# Patient Record
Sex: MALE | Race: ASIAN | Employment: FULL TIME | URBAN - METROPOLITAN AREA
[De-identification: names, ages, dates, MRNs, and addresses within clinical notes are randomized per-mention and may not be internally consistent; named-entity substitution may affect disease eponyms.]

---

## 2024-01-28 ENCOUNTER — HOSPITAL ENCOUNTER (OUTPATIENT)
Age: 34
Discharge: HOME | End: 2024-01-28

## 2024-01-28 VITALS
DIASTOLIC BLOOD PRESSURE: 81 MMHG | RESPIRATION RATE: 20 BRPM | SYSTOLIC BLOOD PRESSURE: 140 MMHG | OXYGEN SATURATION: 97 % | TEMPERATURE: 97 F | HEART RATE: 92 BPM

## 2024-01-28 DIAGNOSIS — J06.9 UPPER RESPIRATORY TRACT INFECTION, UNSPECIFIED TYPE: ICD-10-CM

## 2024-01-28 DIAGNOSIS — Z87.09 HISTORY OF ASTHMA: ICD-10-CM

## 2024-01-28 DIAGNOSIS — Z76.0 ENCOUNTER FOR MEDICATION REFILL: Primary | ICD-10-CM

## 2024-01-28 PROCEDURE — 99203 OFFICE O/P NEW LOW 30 MIN: CPT

## 2024-01-28 PROCEDURE — 99204 OFFICE O/P NEW MOD 45 MIN: CPT

## 2024-01-28 RX ORDER — ALBUTEROL SULFATE 90 UG/1
2 AEROSOL, METERED RESPIRATORY (INHALATION) EVERY 4 HOURS PRN
Qty: 1 EACH | Refills: 0 | Status: SHIPPED | OUTPATIENT
Start: 2024-01-28 | End: 2024-02-27

## 2024-01-28 NOTE — DISCHARGE INSTRUCTIONS
Use inhaler as needed for shortness of breath and/or wheezing    Alternate Tylenol and Motrin every 3 hours for pain or fever > 100.4 degrees  Drink plenty of fluids   Get plenty of rest     You may benefit from taking a decongestant (e.g. Sudafed)  You may benefit from taking a daily allergy medication (e.g. Zyrtec)  You may benefit from using a humidifier    Sleep with head elevated and avoid laying flat  Avoid having air blow on your face    Wash hands often  Disinfect your environment  Do not share utensils or drinks    Symptoms may take a few weeks to resolve  Follow up with your primary care provider

## 2024-01-28 NOTE — ED PROVIDER NOTES
Chief Complaint   Patient presents with    Refill Request       History obtained from: patient    services not used    HPI:     Kellen Vivas is a 33 year old male who presents for medication refill. Patient states he has a history of asthma and is running low on his albuterol inhaler and needs a refill.  Patient states he has had upper respiratory symptoms including cough, nasal congestion, and postnasal drip for the past 1 to 2 weeks.  Patient states symptoms are improving and he is feeling well but states he has had to use his inhaler occasionally for wheezing associated with coughing.  Patient states [place] is going out of the country tomorrow and would like a refill before he leaves.  Denies fever, chills, chest pain, shortness of breath, dizziness, syncope, abdominal pain, nausea, vomiting, diarrhea, rash.    PMH  History reviewed. No pertinent past medical history.    PFSH    PFS asessment screens reviewed and agree.  Nurses notes reviewed I agree with documentation.    No family history on file.  Family history reviewed with patient/caregiver and is not pertinent to presenting problem.  Social History     Socioeconomic History    Marital status:      Spouse name: Not on file    Number of children: Not on file    Years of education: Not on file    Highest education level: Not on file   Occupational History    Not on file   Tobacco Use    Smoking status: Not on file    Smokeless tobacco: Not on file   Substance and Sexual Activity    Alcohol use: Not on file    Drug use: Not on file    Sexual activity: Not on file   Other Topics Concern    Not on file   Social History Narrative    Not on file     Social Determinants of Health     Financial Resource Strain: Not on file   Food Insecurity: Not on file   Transportation Needs: Not on file   Physical Activity: Not on file   Stress: Not on file   Social Connections: Not on file   Housing Stability: Not on file         ROS:   Positive for stated  complaint: Cough  All other systems reviewed and negative except as noted above.  Constitutional and Vital Signs Reviewed.    Physical Exam:     Findings:    /81   Pulse 92   Temp 97.1 °F (36.2 °C) (Temporal)   Resp 20   SpO2 97%   GENERAL: well developed, no acute distress, non-toxic appearing   SKIN: good skin turgor, no obvious rashes  HEAD: normocephalic, atraumatic  EYES: sclera non-icteric bilaterally, conjunctiva clear  NOSE: nasal turbinates: pink, normal mucosa  OROPHARYNX: MMM, pharynx clear, without exudates or swelling, uvula midline, airway patent  NECK: supple, no adenopathy, no nuchal rigidity, no trismus, no edema, phonation normal    CARDIO: RRR, normal heart sounds   LUNGS: faint rhonchi to right upper lung field otherwise lungs CTAB, no wheezing, no increased WOB  EXTREMITIES: no cyanosis or edema, KIRK without difficulty  NEURO: no focal deficits  PSYCH: alert and oriented x3.  Answering questions appropriately.  Mood appropriate.    MDM/Assessment/Plan:   Orders for this encounter:    Orders Placed This Encounter    albuterol 108 (90 Base) MCG/ACT Inhalation Aero Soln     Sig: Inhale 2 puffs into the lungs every 4 (four) hours as needed for Wheezing.     Dispense:  1 each     Refill:  0       Labs performed this visit:  No results found for this or any previous visit (from the past 10 hour(s)).    Imaging performed this visit:  No orders to display       MDM:  DDx includes viral URI versus bronchitis versus pneumonia versus asthma exacerbation versus other.  Patient is overall very well-appearing with stable vitals and tolerating oral intake.  No hypoxia or signs of respiratory distress.  No chest pain or shortness of breath.  Offered chest x-ray given faint rhonchi to right upper lung fields however patient declines chest x-ray and states his upper respiratory symptoms are improving and he is feeling better.  Discussed risks of declining x-ray of which patient verbalizes understanding  and continues to decline chest x-ray.  Will refill patient's albuterol inhaler.  Discussed supportive care.  Instructed patient to go directly to nearest ER with any worsening or concerning symptoms.  Follow-up with PCP.    Diagnosis:    ICD-10-CM    1. Encounter for medication refill  Z76.0       2. Upper respiratory tract infection, unspecified type  J06.9       3. History of asthma  Z87.09           All results reviewed and discussed with patient/patient's family. Patient/patient's family verbalize understanding of instructions. All of patient's/patient's family's questions were addressed.   See AVS for detailed discharge instructions for your condition today.    Follow Up with:  Luisana Sun MD  [address]  [place] [address]  [phone]      New primary care provider         Adriana Mitchell PA-C

## 2024-01-28 NOTE — ED INITIAL ASSESSMENT (HPI)
Patient with hx of asthma.  Reports uri symptoms x 8 days.  + congestion, runny nose and cough.  Denies fevers.  Requesting refill of inhaler.

## 2025-03-04 ENCOUNTER — LAB SERVICES (OUTPATIENT)
Dept: LAB | Age: 35
End: 2025-03-04

## 2025-03-04 DIAGNOSIS — L30.9 ECZEMA, UNSPECIFIED TYPE: ICD-10-CM

## 2025-03-04 DIAGNOSIS — Z00.00 PREVENTATIVE HEALTH CARE: ICD-10-CM

## 2025-03-04 DIAGNOSIS — E66.9 OBESITY (BMI 30-39.9): ICD-10-CM

## 2025-03-04 LAB
ALBUMIN SERPL-MCNC: 3.6 G/DL (ref 3.4–5)
ALBUMIN/GLOB SERPL: 1 {RATIO} (ref 1–2.4)
ALP SERPL-CCNC: 79 UNITS/L (ref 45–117)
ALT SERPL-CCNC: 60 UNITS/L
ANION GAP SERPL CALC-SCNC: 7 MMOL/L (ref 7–19)
AST SERPL-CCNC: 24 UNITS/L
BASOPHILS # BLD: 0.1 K/MCL (ref 0–0.3)
BASOPHILS NFR BLD: 1 %
BILIRUB SERPL-MCNC: 0.5 MG/DL (ref 0.2–1)
BUN SERPL-MCNC: 12 MG/DL (ref 6–20)
BUN/CREAT SERPL: 15 (ref 7–25)
CALCIUM SERPL-MCNC: 8.9 MG/DL (ref 8.4–10.2)
CHLORIDE SERPL-SCNC: 109 MMOL/L (ref 97–110)
CHOLEST SERPL-MCNC: 197 MG/DL
CHOLEST/HDLC SERPL: 4.3 {RATIO}
CO2 SERPL-SCNC: 27 MMOL/L (ref 21–32)
CREAT SERPL-MCNC: 0.81 MG/DL (ref 0.67–1.17)
DEPRECATED RDW RBC: 41.5 FL (ref 39–50)
EGFRCR SERPLBLD CKD-EPI 2021: >90 ML/MIN/{1.73_M2}
EOSINOPHIL # BLD: 0.3 K/MCL (ref 0–0.5)
EOSINOPHIL NFR BLD: 4 %
ERYTHROCYTE [DISTWIDTH] IN BLOOD: 13.2 % (ref 11–15)
FASTING DURATION TIME PATIENT: NORMAL H
GLOBULIN SER-MCNC: 3.7 G/DL (ref 2–4)
GLUCOSE SERPL-MCNC: 96 MG/DL (ref 70–99)
HCT VFR BLD CALC: 42.9 % (ref 39–51)
HDLC SERPL-MCNC: 46 MG/DL
HGB BLD-MCNC: 13.3 G/DL (ref 13–17)
IMM GRANULOCYTES # BLD AUTO: 0 K/MCL (ref 0–0.2)
IMM GRANULOCYTES # BLD: 0 %
LDLC SERPL CALC-MCNC: 138 MG/DL
LYMPHOCYTES # BLD: 1.9 K/MCL (ref 1–4.8)
LYMPHOCYTES NFR BLD: 27 %
MCH RBC QN AUTO: 26.8 PG (ref 26–34)
MCHC RBC AUTO-ENTMCNC: 31 G/DL (ref 32–36.5)
MCV RBC AUTO: 86.3 FL (ref 78–100)
MONOCYTES # BLD: 0.5 K/MCL (ref 0.3–0.9)
MONOCYTES NFR BLD: 7 %
NEUTROPHILS # BLD: 4.2 K/MCL (ref 1.8–7.7)
NEUTROPHILS NFR BLD: 61 %
NONHDLC SERPL-MCNC: 151 MG/DL
NRBC BLD MANUAL-RTO: 0 /100 WBC
PLATELET # BLD AUTO: 299 K/MCL (ref 140–450)
POTASSIUM SERPL-SCNC: 4 MMOL/L (ref 3.4–5.1)
PROT SERPL-MCNC: 7.3 G/DL (ref 6.4–8.2)
RBC # BLD: 4.97 MIL/MCL (ref 4.5–5.9)
SODIUM SERPL-SCNC: 139 MMOL/L (ref 135–145)
TRIGL SERPL-MCNC: 67 MG/DL
TSH SERPL-ACNC: 1.19 MCUNITS/ML (ref 0.35–5)
WBC # BLD: 7 K/MCL (ref 4.2–11)

## 2025-03-04 PROCEDURE — 36415 COLL VENOUS BLD VENIPUNCTURE: CPT | Performed by: CLINICAL MEDICAL LABORATORY

## 2025-03-04 PROCEDURE — 83036 HEMOGLOBIN GLYCOSYLATED A1C: CPT | Performed by: CLINICAL MEDICAL LABORATORY

## 2025-03-04 PROCEDURE — 80050 GENERAL HEALTH PANEL: CPT | Performed by: CLINICAL MEDICAL LABORATORY

## 2025-03-04 PROCEDURE — 80061 LIPID PANEL: CPT | Performed by: CLINICAL MEDICAL LABORATORY

## 2025-03-05 LAB — HBA1C MFR BLD: 5.7 % (ref 4.5–5.6)

## 2025-03-10 ENCOUNTER — APPOINTMENT (OUTPATIENT)
Dept: SURGERY | Age: 35
End: 2025-03-10
Attending: INTERNAL MEDICINE

## 2025-03-10 ENCOUNTER — PREP FOR CASE (OUTPATIENT)
Dept: SURGERY | Age: 35
End: 2025-03-10

## 2025-03-10 VITALS
OXYGEN SATURATION: 100 % | WEIGHT: 258.6 LBS | SYSTOLIC BLOOD PRESSURE: 128 MMHG | BODY MASS INDEX: 39.19 KG/M2 | HEIGHT: 68 IN | HEART RATE: 78 BPM | DIASTOLIC BLOOD PRESSURE: 84 MMHG

## 2025-03-10 DIAGNOSIS — R22.0 SCALP MASS: Primary | ICD-10-CM

## 2025-03-10 PROCEDURE — 99204 OFFICE O/P NEW MOD 45 MIN: CPT

## 2025-03-10 ASSESSMENT — PAIN SCALES - GENERAL: PAINLEVEL: 0

## 2025-04-03 ENCOUNTER — HOSPITAL ENCOUNTER (OUTPATIENT)
Age: 35
Discharge: HOME | End: 2025-04-03

## 2025-04-03 VITALS
SYSTOLIC BLOOD PRESSURE: 140 MMHG | RESPIRATION RATE: 14 BRPM | OXYGEN SATURATION: 99 % | HEIGHT: 68 IN | DIASTOLIC BLOOD PRESSURE: 69 MMHG | WEIGHT: 255.95 LBS | BODY MASS INDEX: 38.79 KG/M2 | HEART RATE: 79 BPM | TEMPERATURE: 97.2 F

## 2025-04-03 DIAGNOSIS — R22.0 SCALP MASS: ICD-10-CM

## 2025-04-03 PROCEDURE — 87070 CULTURE OTHR SPECIMN AEROBIC: CPT

## 2025-04-03 PROCEDURE — 10006023 HB SUPPLY 272

## 2025-04-03 PROCEDURE — 88304 TISSUE EXAM BY PATHOLOGIST: CPT

## 2025-04-03 PROCEDURE — 10002801 HB RX 250 W/O HCPCS

## 2025-04-03 PROCEDURE — 13000054 HB LOCAL 1 CASE CHARGE

## 2025-04-03 PROCEDURE — 87076 CULTURE ANAEROBE IDENT EACH: CPT

## 2025-04-03 PROCEDURE — 10061 I&D ABSCESS COMP/MULTIPLE: CPT

## 2025-04-03 RX ORDER — LIDOCAINE HYDROCHLORIDE AND EPINEPHRINE 10; 10 MG/ML; UG/ML
INJECTION, SOLUTION INFILTRATION; PERINEURAL PRN
Status: DISCONTINUED | OUTPATIENT
Start: 2025-04-03 | End: 2025-04-03 | Stop reason: HOSPADM

## 2025-04-03 ASSESSMENT — PAIN SCALES - GENERAL: PAINLEVEL_OUTOF10: 0

## 2025-04-05 LAB
BACTERIA SPEC ANAEROBE+AEROBE CULT: NORMAL
GRAM STN SPEC: NORMAL

## 2025-04-07 LAB
BACTERIA SPEC ANAEROBE+AEROBE CULT: ABNORMAL
GRAM STN SPEC: ABNORMAL

## 2025-04-08 LAB
ASR DISCLAIMER: NORMAL
CASE RPRT: NORMAL
CLINICAL INFO: NORMAL
PATH REPORT.FINAL DX SPEC: NORMAL
PATH REPORT.GROSS SPEC: NORMAL

## 2025-04-09 PROBLEM — L72.3 SEBACEOUS CYST: Status: ACTIVE | Noted: 2025-04-09

## 2025-04-09 PROBLEM — L02.92 BOIL: Status: ACTIVE | Noted: 2025-04-09

## 2025-04-09 PROBLEM — E66.9 OBESITY (BMI 30-39.9): Status: ACTIVE | Noted: 2025-04-09

## 2025-04-09 PROBLEM — L30.9 ECZEMA: Status: ACTIVE | Noted: 2025-04-09

## 2025-04-09 PROBLEM — Z00.00 PREVENTATIVE HEALTH CARE: Status: ACTIVE | Noted: 2025-04-09

## 2025-04-16 PROBLEM — Z98.890 STATUS POST INCISION AND DRAINAGE: Status: ACTIVE | Noted: 2025-04-16

## 2025-04-17 ENCOUNTER — APPOINTMENT (OUTPATIENT)
Dept: SURGERY | Age: 35
End: 2025-04-17

## 2025-04-17 VITALS
WEIGHT: 250 LBS | DIASTOLIC BLOOD PRESSURE: 71 MMHG | SYSTOLIC BLOOD PRESSURE: 139 MMHG | HEIGHT: 68 IN | OXYGEN SATURATION: 97 % | HEART RATE: 76 BPM | BODY MASS INDEX: 37.89 KG/M2

## 2025-04-17 DIAGNOSIS — L72.3 SEBACEOUS CYST: Primary | ICD-10-CM

## 2025-04-17 PROCEDURE — 99024 POSTOP FOLLOW-UP VISIT: CPT

## 2025-04-17 ASSESSMENT — PAIN SCALES - GENERAL: PAINLEVEL_OUTOF10: 0

## (undated) DEVICE — COVER STAND W23 IN REINFORCE PLASTIC

## (undated) DEVICE — CONTAINER SPEC 4 OZ STD SCR TOP LID GRAD TRANS GENT-L-KARE

## (undated) DEVICE — CAUTERY ESURG .5IN ACCU-TEMP 1205C HI VAR TEMP LOOP TIP STRL

## (undated) DEVICE — GLOVE SURG 7.5 PROTEXIS LF CRM PF SMTH BEAD CUFF STRL

## (undated) DEVICE — NEEDLE HPO L1 12 IN OD27 GA REG WALL BD STNLS STL PP REG

## (undated) DEVICE — GLOVE SURG 6.5 PROTEXIS LF CRM PF BEAD CUFF STRL PLISPRN

## (undated) DEVICE — SPONGE SURG 4X4IN CTN 16 PLY XRY DTCT STRL LF DISP

## (undated) DEVICE — Device

## (undated) DEVICE — PEN SURGMRK DEVON SKIN DISP REG TIP RULER CAP GENTIAN VIOL

## (undated) DEVICE — SUTURE PROLENE BLUE 3-0 PS-2 L18 IN MONO NABSB

## (undated) DEVICE — SYRINGE 10 ML CNTRL CONC TIP PYROGEN FREE DEHP FREE LOK MED

## (undated) DEVICE — TOWEL OR BLU 16X26IN 4PK